# Patient Record
Sex: FEMALE | Race: WHITE | Employment: OTHER | URBAN - METROPOLITAN AREA
[De-identification: names, ages, dates, MRNs, and addresses within clinical notes are randomized per-mention and may not be internally consistent; named-entity substitution may affect disease eponyms.]

---

## 2024-07-08 ENCOUNTER — APPOINTMENT (OUTPATIENT)
Dept: GENERAL RADIOLOGY | Age: 64
End: 2024-07-08
Attending: EMERGENCY MEDICINE
Payer: MEDICARE

## 2024-07-08 ENCOUNTER — HOSPITAL ENCOUNTER (EMERGENCY)
Age: 64
Discharge: HOME | End: 2024-07-08
Attending: EMERGENCY MEDICINE
Payer: MEDICARE

## 2024-07-08 VITALS
WEIGHT: 155 LBS | RESPIRATION RATE: 16 BRPM | HEIGHT: 63 IN | DIASTOLIC BLOOD PRESSURE: 97 MMHG | SYSTOLIC BLOOD PRESSURE: 148 MMHG | BODY MASS INDEX: 27.46 KG/M2 | HEART RATE: 85 BPM | OXYGEN SATURATION: 95 % | TEMPERATURE: 98 F

## 2024-07-08 DIAGNOSIS — S80.01XA CONTUSION OF RIGHT KNEE, INITIAL ENCOUNTER: Primary | ICD-10-CM

## 2024-07-08 DIAGNOSIS — M71.50 TRAUMATIC BURSITIS: ICD-10-CM

## 2024-07-08 PROCEDURE — 99284 EMERGENCY DEPT VISIT MOD MDM: CPT

## 2024-07-08 PROCEDURE — 73562 X-RAY EXAM OF KNEE 3: CPT | Performed by: EMERGENCY MEDICINE

## 2024-07-08 PROCEDURE — 99283 EMERGENCY DEPT VISIT LOW MDM: CPT

## 2024-07-08 RX ORDER — BUPROPION HYDROCHLORIDE 300 MG/1
TABLET ORAL
COMMUNITY
Start: 2024-01-30

## 2024-07-08 RX ORDER — ROSUVASTATIN CALCIUM 10 MG/1
TABLET, COATED ORAL
COMMUNITY
Start: 2022-05-05

## 2024-07-08 NOTE — DISCHARGE INSTRUCTIONS
Take 1000 mg acetaminophen (2 Tylenol tablets) and/or 600 mg ibuprofen (3 Advil tablets) every 6 hours as needed for pain or fever.    Keep compression bandage in place for 2 weeks.     Make appointment if no better in 1 week with [facility].

## 2024-07-08 NOTE — ED PROVIDER NOTES
Patient Seen in: [facility] In [place]      History     Chief Complaint   Patient presents with    Leg or Foot Injury     Stated Complaint: mechanical fall on steps. right leg injury    Subjective:     HPI    63-year-old woman presented with a complaint of pain in her knee and ankle. The pain started after she missed a step while descending stairs, causing her ankle to twist and his knee to experience an unusual sensation.  She she reported that the incident occurred the previous day, in the later afternoon. He initially tried to walk on her she tiptoes but eventually found the pain too severe. The pain seems to be most severe when he puts weight on the affected leg.  She described the pain as shooting from his ankle up to his heart knee.  She denied any previous similar incidents or injuries. She reported taking four ibuprofen at a time for the pain, but did not find it very effective.  Objective:   Past Medical History:    Allergic rhinitis    Arthritis    Asthma (HCC)    Back pain    Body piercing    Decorative tattoo    High cholesterol    Sleep apnea    Stress    Wheezing              Past Surgical History:   Procedure Laterality Date          1996 - general/epidural    Other      removal of right great toe-ingrown toenail    Sinus surgery    2016    dr colindres polyps                Social History     Socioeconomic History    Marital status:    Tobacco Use    Smoking status: Never   Substance and Sexual Activity    Alcohol use: Yes     Alcohol/week: 6.0 standard drinks of alcohol     Types: 6 Shots of liquor per week     Comment: cocktail 4-5 on weekends    Drug use: No              Review of Systems    Positive for stated complaint: mechanical fall on steps. right leg injury  Other systems are as noted in HPI.  Constitutional and vital signs reviewed.      All other systems reviewed and negative except as noted above.    Physical Exam     ED Triage Vitals  [07/08/24 0815]   BP (!) 148/97   Pulse 85   Resp 16   Temp 97.9 °F (36.6 °C)   Temp src    SpO2 95 %   O2 Device None (Room air)       Current:BP (!) 148/97   Pulse 85   Temp 97.9 °F (36.6 °C)   Resp 16   Ht 160 cm (5' 3\")   Wt 70.3 kg   SpO2 95%   BMI 27.46 kg/m²       General:  Vitals as listed.  No acute distress   Extremities: Right suprapatellar bursa fullness with no obvious ecchymosis.  Moderate tenderness over the patella.  No ligamentous instability.  Neuro: Alert oriented and nonfocal      ED Course   Labs Reviewed - No data to display  XR KNEE (3 VIEWS), RIGHT (CPT=73562)    Result Date: 7/8/2024  PROCEDURE:  XR KNEE ROUTINE (3 VIEWS), RIGHT (CPT=73562)  TECHNIQUE:  Three views were obtained including patellar view.  COMPARISON:  None.  INDICATIONS:  Right knee pain  PATIENT STATED HISTORY: (As transcribed by Technologist)  Patient states she fell yesterday and is having right knee pain. She has pain when flexing knee that radiates up to right hip.   FINDINGS:   No evidence of fracture or dislocation  Tiny suprapatellar joint effusion.  Joint spaces are well maintained.  Tiny marginal enthesophytes are noted.  IMPRESSION:  Mild osteoarthritic changes in the right knee.   LOCATION:  [facility]   Dictated by (CST): Anoop Delgado MD on 7/08/2024 at 9:26 AM     Finalized by (CST): Anoop Delgado MD on 7/08/2024 at 9:26 AM        ED COURSE and MDM     Sources of the medical history included the patient and her     Patient also expressed concern about whether he might need an MRI, but was reassured that this would likely not be necessary unless the pain did not improve after several weeks.    To use Tylenol/ibuprofen for pain control.  Can wear compression bandage for 2 weeks.  Patient has crutches and was told that she can start weightbearing as tolerated.    I have discussed with the patient the results of testing, differential diagnosis, and treatment plan. They expressed clear  understanding of these instructions and agrees to the plan provided.    Disposition and Plan     Clinical Impression:  1. Contusion of right knee, initial encounter    2. Traumatic bursitis         Disposition:  Discharge  7/8/2024  9:06 am    Follow-up:  [facility], [address] [address]  [phone]  Follow up in 1 week(s)  As needed        Medications Prescribed:  Discharge Medication List as of 7/8/2024  9:13 AM